# Patient Record
Sex: FEMALE | Race: WHITE | NOT HISPANIC OR LATINO | Employment: OTHER | URBAN - METROPOLITAN AREA
[De-identification: names, ages, dates, MRNs, and addresses within clinical notes are randomized per-mention and may not be internally consistent; named-entity substitution may affect disease eponyms.]

---

## 2019-06-27 ENCOUNTER — TELEPHONE (OUTPATIENT)
Dept: ORTHOPEDICS | Facility: CLINIC | Age: 74
End: 2019-06-27

## 2019-06-27 NOTE — TELEPHONE ENCOUNTER
----- Message from Frank Davidson sent at 6/27/2019 10:52 AM CDT -----  Contact: self   Patient is scheduled for a injection appointment on 7/2. We normally send messages for a injection appt. Will this date work for patient, please call back at  [phone] (home)

## 2019-07-01 DIAGNOSIS — M25.561 RIGHT KNEE PAIN, UNSPECIFIED CHRONICITY: Primary | ICD-10-CM

## 2019-07-01 RX ORDER — GARLIC 1000 MG
CAPSULE ORAL
COMMUNITY

## 2019-07-01 RX ORDER — VITAMIN E 268 MG
400 CAPSULE ORAL
COMMUNITY

## 2019-07-01 RX ORDER — NIFEDIPINE 30 MG/1
30 TABLET, FILM COATED, EXTENDED RELEASE ORAL
COMMUNITY
Start: 2019-01-16

## 2019-07-01 RX ORDER — BIOTIN 1 MG
TABLET ORAL
COMMUNITY

## 2019-07-01 RX ORDER — LANOLIN ALCOHOL/MO/W.PET/CERES
500 CREAM (GRAM) TOPICAL
COMMUNITY

## 2019-07-01 RX ORDER — ASPIRIN 81 MG/1
81 TABLET ORAL 2 TIMES DAILY
COMMUNITY

## 2019-07-01 RX ORDER — VITAMIN E (DL,TOCOPHERYL ACET) 180 MG
CAPSULE ORAL
COMMUNITY

## 2019-07-01 RX ORDER — SIMVASTATIN 40 MG/1
40 TABLET, FILM COATED ORAL
COMMUNITY
Start: 2019-06-19

## 2019-07-02 ENCOUNTER — HOSPITAL ENCOUNTER (OUTPATIENT)
Dept: RADIOLOGY | Facility: HOSPITAL | Age: 74
Discharge: HOME | End: 2019-07-02
Attending: ORTHOPAEDIC SURGERY
Payer: MEDICARE

## 2019-07-02 ENCOUNTER — OFFICE VISIT (OUTPATIENT)
Dept: ORTHOPEDICS | Facility: CLINIC | Age: 74
End: 2019-07-02
Payer: MEDICARE

## 2019-07-02 VITALS — HEIGHT: 64 IN | WEIGHT: 145 LBS | BODY MASS INDEX: 24.75 KG/M2

## 2019-07-02 DIAGNOSIS — M25.561 RIGHT KNEE PAIN, UNSPECIFIED CHRONICITY: ICD-10-CM

## 2019-07-02 DIAGNOSIS — M17.11 PRIMARY OSTEOARTHRITIS OF RIGHT KNEE: Primary | ICD-10-CM

## 2019-07-02 PROCEDURE — 20610 LARGE JOINT ASPIRATION/INJECTION: R KNEE: ICD-10-PCS | Mod: RT,S$GLB,, | Performed by: ORTHOPAEDIC SURGERY

## 2019-07-02 PROCEDURE — 99212 OFFICE O/P EST SF 10 MIN: CPT | Mod: PBBFAC,25,PN | Performed by: ORTHOPAEDIC SURGERY

## 2019-07-02 PROCEDURE — 99999 PR PBB SHADOW E&M-EST. PATIENT-LVL II: CPT | Mod: PBBFAC,,, | Performed by: ORTHOPAEDIC SURGERY

## 2019-07-02 PROCEDURE — 73560 X-RAY EXAM OF KNEE 1 OR 2: CPT | Mod: 26,59,LT, | Performed by: RADIOLOGY

## 2019-07-02 PROCEDURE — 73562 X-RAY EXAM OF KNEE 3: CPT | Mod: TC,PO,RT

## 2019-07-02 PROCEDURE — 73560 X-RAY EXAM OF KNEE 1 OR 2: CPT | Mod: TC,PO,RT

## 2019-07-02 PROCEDURE — 73560 XR KNEE ORTHO RIGHT: ICD-10-PCS | Mod: 26,59,LT, | Performed by: RADIOLOGY

## 2019-07-02 PROCEDURE — 99204 OFFICE O/P NEW MOD 45 MIN: CPT | Mod: 25,S$GLB,, | Performed by: ORTHOPAEDIC SURGERY

## 2019-07-02 PROCEDURE — 99999 PR PBB SHADOW E&M-EST. PATIENT-LVL II: ICD-10-PCS | Mod: PBBFAC,,, | Performed by: ORTHOPAEDIC SURGERY

## 2019-07-02 PROCEDURE — 73562 X-RAY EXAM OF KNEE 3: CPT | Mod: 26,RT,, | Performed by: RADIOLOGY

## 2019-07-02 PROCEDURE — 20610 DRAIN/INJ JOINT/BURSA W/O US: CPT | Mod: PBBFAC,PN | Performed by: ORTHOPAEDIC SURGERY

## 2019-07-02 PROCEDURE — 99204 PR OFFICE/OUTPT VISIT, NEW, LEVL IV, 45-59 MIN: ICD-10-PCS | Mod: 25,S$GLB,, | Performed by: ORTHOPAEDIC SURGERY

## 2019-07-02 PROCEDURE — 73562 XR KNEE ORTHO RIGHT: ICD-10-PCS | Mod: 26,RT,, | Performed by: RADIOLOGY

## 2019-07-02 RX ORDER — TRAMADOL HYDROCHLORIDE 50 MG/1
50 TABLET ORAL EVERY 4 HOURS PRN
Qty: 44 TABLET | Refills: 0 | Status: SHIPPED | OUTPATIENT
Start: 2019-07-02

## 2019-07-02 RX ADMIN — TRIAMCINOLONE ACETONIDE 40 MG: 40 INJECTION, SUSPENSION INTRA-ARTICULAR; INTRAMUSCULAR at 10:07

## 2019-07-02 NOTE — PROCEDURES
Large Joint Aspiration/Injection: R knee  Date/Time: 7/2/2019 10:00 AM  Performed by: Javier Bains MD  Authorized by: Javier Bains MD     Consent Done?:  Yes (Verbal)  Indications:  Pain  Procedure site marked: Yes    Timeout: Prior to procedure the correct patient, procedure, and site was verified      Location:  Knee  Site:  R knee  Prep: Patient was prepped and draped in usual sterile fashion    Needle size:  21 G  Approach:  Anterolateral  Medications:  40 mg triamcinolone acetonide 40 mg/mL; 40 mg triamcinolone acetonide 40 mg/mL  Patient tolerance:  Patient tolerated the procedure well with no immediate complications

## 2019-07-02 NOTE — PROGRESS NOTES
Chief Complaint   Patient presents with    Knee Pain     right knee pain         HPI:   This is a 73 y.o. who presents to clinic today complaining of right knee pain for 3 weeks after no known trauma. Pain is progressively worsening. No numbness or tingling. No associated signs or symptoms.    Past Medical History:   Diagnosis Date    Chronic renal insufficiency     Degeneration of lumbar or lumbosacral intervertebral disc     History of ovarian cancer     Hypertension     OA (osteoarthritis) of knee     right knee    Osteopenia     Sciatica of right side     Vitamin D deficiency      Past Surgical History:   Procedure Laterality Date    FOOT SURGERY Left      Current Outpatient Medications on File Prior to Visit   Medication Sig Dispense Refill    ASCORBATE CALCIUM, VITAMIN C, ORAL Take 1,000 mg by mouth.      aspirin (ECOTRIN) 81 MG EC tablet Take 81 mg by mouth 2 (two) times daily.      biotin 1 mg tablet Take by mouth.      flaxseed oil (LINSEED OIL) Oil Take 1,000 mg by mouth.      garlic 1,000 mg Cap Take by mouth.      multivitamin-Ca-iron-minerals (ONE DAILY WOMEN'S) 27-0.4 mg Tab       niacin 500 mg TbSR Take 500 mg by mouth.      NIFEdipine (ADALAT CC) 30 MG TbSR Take 30 mg by mouth.      salmon oil-omega-3 fatty acids 1,000-200 mg Cap Take 2,000 mg by mouth.      simvastatin (ZOCOR) 40 MG tablet Take 40 mg by mouth.      turmeric/turmeric ext/pepr ext (TURMERIC-TURMERIC EXT-PEPPER) 500-3 mg Cap Take by mouth.      vitamin E 400 UNIT capsule Take 400 Units by mouth.       No current facility-administered medications on file prior to visit.      Review of patient's allergies indicates:   Allergen Reactions    Lisinopril      Other reaction(s): Other (See Comments)  Cough     History reviewed. No pertinent family history.  Social History     Socioeconomic History    Marital status:      Spouse name: Not on file    Number of children: Not on file    Years of education: Not on  file    Highest education level: Not on file   Occupational History    Not on file   Social Needs    Financial resource strain: Not on file    Food insecurity:     Worry: Not on file     Inability: Not on file    Transportation needs:     Medical: Not on file     Non-medical: Not on file   Tobacco Use    Smoking status: Never Smoker    Smokeless tobacco: Never Used   Substance and Sexual Activity    Alcohol use: Never     Frequency: Never    Drug use: Never    Sexual activity: Not on file   Lifestyle    Physical activity:     Days per week: Not on file     Minutes per session: Not on file    Stress: Not on file   Relationships    Social connections:     Talks on phone: Not on file     Gets together: Not on file     Attends [employer] service: Not on file     Active member of club or organization: Not on file     Attends meetings of clubs or organizations: Not on file     Relationship status: Not on file   Other Topics Concern    Not on file   Social History Narrative    Not on file       Review of Systems:  Constitutional:  Denies fever or chills   Eyes:  Denies change in visual acuity   HENT:  Denies nasal congestion or sore throat   Respiratory:  Denies cough or shortness of breath   Cardiovascular:  Denies chest pain or edema   GI:  Denies abdominal pain, nausea, vomiting, bloody stools or diarrhea   :  Denies dysuria   Integument:  Denies rash   Neurologic:  Denies headache, focal weakness or sensory changes   Endocrine:  Denies polyuria or polydipsia   Lymphatic:  Denies swollen glands   Psychiatric:  Denies depression or anxiety     Physical Exam:   Constitutional:  Well developed, well nourished, no acute distress, non-toxic appearance   Integument:  Well hydrated, no rash   Lymphatic:  No lymphadenopathy noted   Neurologic:  Alert & oriented x 3  Psychiatric:  Speech and behavior appropriate     Bilateral Knee Exam    right Knee Exam     Tenderness   The patient is experiencing tenderness in  the medial joint line.    Range of Motion   Extension: abnormal   Flexion: abnormal     Muscle Strength     The patient has normal knee strength.    Tests   Sy:  Medial - positive   Lachman:  Anterior - negative      Varus: negative  Valgus: negative  Patellar Apprehension: negative    Other   Erythema: absent  Sensation: normal  Pulse: present  Swelling: mild      left Knee Exam   left knee exam performed same as contralateral side and is normal.            X-rays were performed, personally reviewed by me and findings discussed with the patient.  3 views of the right knee show tricompartmental degenerative change most pronounced in the medial compartment    Primary osteoarthritis of right knee  -     Large Joint Aspiration/Injection: R knee    Other orders  -     traMADol (ULTRAM) 50 mg tablet; Take 1 tablet (50 mg total) by mouth every 4 (four) hours as needed for Pain (severe pain).  Dispense: 44 tablet; Refill: 0            Using an aseptic technique, I injected 5 cc of lidocaine 1% without and 1 cc of kenalog 40mg into the right knee. The patient tolerated this well. I will have them return to clinic as needed.

## 2019-07-10 RX ORDER — TRIAMCINOLONE ACETONIDE 40 MG/ML
40 INJECTION, SUSPENSION INTRA-ARTICULAR; INTRAMUSCULAR
Status: DISCONTINUED | OUTPATIENT
Start: 2019-07-02 | End: 2019-07-10 | Stop reason: HOSPADM

## 2021-10-11 ENCOUNTER — TELEPHONE (OUTPATIENT)
Dept: VASCULAR SURGERY | Facility: CLINIC | Age: 76
End: 2021-10-11